# Patient Record
Sex: FEMALE | Race: WHITE | URBAN - METROPOLITAN AREA
[De-identification: names, ages, dates, MRNs, and addresses within clinical notes are randomized per-mention and may not be internally consistent; named-entity substitution may affect disease eponyms.]

---

## 2022-03-19 PROBLEM — N76.2: Status: ACTIVE | Noted: 2019-11-12

## 2022-03-19 PROBLEM — T65.91XA: Status: ACTIVE | Noted: 2019-11-12

## 2023-03-07 ENCOUNTER — TELEPHONE (OUTPATIENT)
Dept: OBGYN CLINIC | Age: 88
End: 2023-03-07

## 2023-03-07 RX ORDER — CONJUGATED ESTROGENS 0.62 MG/G
CREAM VAGINAL
Qty: 30 G | Refills: 11 | Status: SHIPPED | OUTPATIENT
Start: 2023-03-07

## 2023-03-07 NOTE — TELEPHONE ENCOUNTER
Per Dr Kimberli Alvarado ok\"      Orders Placed This Encounter    estrogens conjugated (PREMARIN) 0.625 MG/GM CREA vaginal cream     Sig: Insert 0.5 grams into the vagina twice a week     Dispense:  30 g     Refill:  11    nystatin-triamcinolone (MYCOLOG II) 717264-6.8 UNIT/GM-% cream     Sig: Apply topically 2 times daily.      Dispense:  30 g     Refill:  11

## 2023-03-07 NOTE — TELEPHONE ENCOUNTER
Pt son calls requesting new rx be sent to [facility] in [facility] as they are transferring all of patient's prescriptions there. He is requesting refills on Premarin vaginal cream (uses twice a week) and Mycoolog II (uses 2 times a day as needed). Spoke with son, he is aware that Dr Francisca Perez has not written the Premarin Vaginal cream for her since 2018. He states that the NP that comes to see her may be the once who recently Rx'd it for her. He confirmed that she is using this twice a week. He is aware that I will speak with Dr Francisca Perez and will call him back if there are any issues with refilling these prescriptions for her. Voiced full understanding.